# Patient Record
Sex: MALE | Race: WHITE | Employment: FULL TIME | URBAN - METROPOLITAN AREA
[De-identification: names, ages, dates, MRNs, and addresses within clinical notes are randomized per-mention and may not be internally consistent; named-entity substitution may affect disease eponyms.]

---

## 2017-06-19 ENCOUNTER — OFFICE VISIT (OUTPATIENT)
Dept: FAMILY MEDICINE | Facility: CLINIC | Age: 32
End: 2017-06-19

## 2017-06-19 VITALS
SYSTOLIC BLOOD PRESSURE: 104 MMHG | DIASTOLIC BLOOD PRESSURE: 70 MMHG | WEIGHT: 139.2 LBS | HEIGHT: 66 IN | HEART RATE: 80 BPM | BODY MASS INDEX: 22.37 KG/M2

## 2017-06-19 DIAGNOSIS — A63.0 GENITAL WARTS: Primary | ICD-10-CM

## 2017-06-19 PROCEDURE — 54056 CRYOSURGERY PENIS LESION(S): CPT | Performed by: FAMILY MEDICINE

## 2017-06-19 NOTE — PROGRESS NOTES
Subjective:  Enmanuel Nair is a 32 year old male   Chief Complaint   Patient presents with     Patient Request     pt. is here in clinic today with concerns about ? STD. growth on shaft of penis X 2 years.      He had been seen before with a papule on the shaft of the penis and this was felt to be a genital wart. It was very small for a long time and then in the last year and increased in size. He also noticed some small and goes in the suprapubic area. He has tried clipping those off with a nail clipper. It would bleed vigorously but he was able to control it. Now is interested in definitive treatment        Encounter Diagnosis   Name Primary?     Genital warts Yes       ROS:other than noted above, general, HEENT, respiratory, cardiac, gastrointestinal systems are negative    Medical, surgical, social, and family histories, medications and allergies reviewed and updated.    Objective:  Exam:    GENERAL APPEARANCE ADULT: Alert, no acute distress  SKIN: Soft papule on the left shaft of the penis, skin colored raised 6 mm in diameter, with a somewhat verrucous surface. 3 mm papule on the suprapubic area which also has some verrucous appearance but also looks like scar tissue, probably from previous attempts to excise      ASSESSMENT:  1. Genital warts    Discussed pathophysiology of this condition and implications.  Questions answered.     PLAN:    Both papules were frozen ×3 using liquid nitrogen and the freeze-thaw-freeze technique;     Orders Placed This Encounter     DESTRUCT BENIGN LESION, 15 OR MORE     IBUPROFEN PO     Recheck if not completely resolved in a month. Discussed how to care for the wounds as these heal

## 2017-06-19 NOTE — MR AVS SNAPSHOT
"              After Visit Summary   2017    Enmanuel Nair    MRN: [id number]           Patient Information     Date Of Birth          1985        Visit Information        Provider Department      2017 9:20 AM VIANNEY Scott MD [facility]        Today's Diagnoses     Genital warts    -  1       Follow-ups after your visit        Who to contact     If you have questions or need follow up information about today's clinic visit or your schedule please contact [facility] directly at [phone].  Normal or non-critical lab and imaging results will be communicated to you by MyChart, letter or phone within 4 business days after the clinic has received the results. If you do not hear from us within 7 days, please contact the clinic through [employer] or phone. If you have a critical or abnormal lab result, we will notify you by phone as soon as possible.  Submit refill requests through [employer] or call your pharmacy and they will forward the refill request to us. Please allow 3 business days for your refill to be completed.          Additional Information About Your Visit        MyChart Information     [employer] lets you send messages to your doctor, view your test results, renew your prescriptions, schedule appointments and more. To sign up, go to [email or web address] . Click on \"Log in\" on the left side of the screen, which will take you to the Welcome page. Then click on \"Sign up Now\" on the right side of the page.     You will be asked to enter the access code listed below, as well as some personal information. Please follow the directions to create your username and password.     Your access code is: [email or web address]  Expires: 2017  9:59 AM     Your access code will  in 90 days. If you need help or a new code, please call your [facility] or [phone].        Care EveryWhere ID     This is your Care EveryWhere ID. This could be used by other organizations " "to access your [facility] medical records  [id number]        Your Vitals Were     Pulse Height BMI (Body Mass Index)             80 5' 6.25\" (1.683 m) 22.3 kg/m2          Blood Pressure from Last 3 Encounters:   06/19/17 104/70   02/07/14 98/64   10/18/13 133/69    Weight from Last 3 Encounters:   06/19/17 139 lb 3.2 oz (63.1 kg)   02/07/14 130 lb 12.8 oz (59.3 kg)   10/18/13 126 lb (57.2 kg)              We Performed the Following     DESTRUCT BENIGN LESION, 15 OR MORE        Primary Care Provider Office Phone # Fax #    VIANNEY Scott [phone]       [address] [address]        Thank you!     Thank you for choosing [facility]  for your care. Our goal is always to provide you with excellent care. Hearing back from our patients is one way we can continue to improve our services. Please take a few minutes to complete the written survey that you may receive in the mail after your visit with us. Thank you!             Your Updated Medication List - Protect others around you: Learn how to safely use, store and throw away your medicines at www.disposemymeds.org.          This list is accurate as of: 6/19/17  9:59 AM.  Always use your most recent med list.                   Brand Name Dispense Instructions for use    * amphetamine-dextroamphetamine 15 MG per tablet    ADDERALL    60 tablet    Take 1 tablet (15 mg) by mouth 2 times daily       * amphetamine-dextroamphetamine 15 MG per tablet    ADDERALL    60 tablet    Take 1 tablet (15 mg) by mouth 2 times daily       IBUPROFEN PO      Take 200 mg by mouth as needed for moderate pain       * Notice:  This list has 2 medication(s) that are the same as other medications prescribed for you. Read the directions carefully, and ask your doctor or other care provider to review them with you.      "

## 2018-05-29 ENCOUNTER — TELEPHONE (OUTPATIENT)
Dept: FAMILY MEDICINE | Facility: CLINIC | Age: 33
End: 2018-05-29

## 2018-05-29 DIAGNOSIS — S30.860A TICK BITE OF BUTTOCK, INITIAL ENCOUNTER: Primary | ICD-10-CM
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
## 2018-05-29 RX ORDER — DOXYCYCLINE HYCLATE 100 MG
200 TABLET ORAL ONCE
Qty: 2 TABLET | Refills: 0 | Status: SHIPPED | OUTPATIENT
Start: 2018-05-29 | End: 2018-05-29

## 2018-05-29 NOTE — TELEPHONE ENCOUNTER
[facility] and found a deer tick attached to the left upper buttock area.  The tick was somewhat engorged and he was not sure whether this had been present for only a short time.  Potentially it could have been there 48 hours.  Therefore we will treat prophylactically with 200 mg of doxycycline ×1

## 2018-05-29 NOTE — TELEPHONE ENCOUNTER
[facility] checking if its ok to dispense monohydrate instead?    If ok, please call [facility] to inform them: [phone].    Addis Preciado   [facility] [place]

## 2022-07-25 ENCOUNTER — LAB REQUISITION (OUTPATIENT)
Dept: LAB | Age: 37
End: 2022-07-25

## 2022-07-25 DIAGNOSIS — Z13.9 ENCOUNTER FOR SCREENING, UNSPECIFIED: ICD-10-CM

## 2022-07-25 PROCEDURE — PSEU8267 HEPATIC FUNCTION PANEL: Performed by: CLINICAL MEDICAL LABORATORY

## 2022-07-25 PROCEDURE — 80076 HEPATIC FUNCTION PANEL: CPT | Performed by: CLINICAL MEDICAL LABORATORY

## 2022-07-25 PROCEDURE — PSEU8303 URIC ACID: Performed by: CLINICAL MEDICAL LABORATORY

## 2022-07-25 PROCEDURE — 86592 SYPHILIS TEST NON-TREP QUAL: CPT | Performed by: CLINICAL MEDICAL LABORATORY

## 2022-07-25 PROCEDURE — 84550 ASSAY OF BLOOD/URIC ACID: CPT | Performed by: CLINICAL MEDICAL LABORATORY

## 2022-07-25 PROCEDURE — PSEU8240 CALCIUM: Performed by: CLINICAL MEDICAL LABORATORY

## 2022-07-25 PROCEDURE — PSEU8247 CHOLESTEROL: Performed by: CLINICAL MEDICAL LABORATORY

## 2022-07-25 PROCEDURE — PSEU8253 CREATININE: Performed by: CLINICAL MEDICAL LABORATORY

## 2022-07-25 PROCEDURE — 84520 ASSAY OF UREA NITROGEN: CPT | Performed by: CLINICAL MEDICAL LABORATORY

## 2022-07-25 PROCEDURE — PSEU8263 GGT: Performed by: CLINICAL MEDICAL LABORATORY

## 2022-07-25 PROCEDURE — PSEU8294 SODIUM: Performed by: CLINICAL MEDICAL LABORATORY

## 2022-07-25 PROCEDURE — 82435 ASSAY OF BLOOD CHLORIDE: CPT | Performed by: CLINICAL MEDICAL LABORATORY

## 2022-07-25 PROCEDURE — 84478 ASSAY OF TRIGLYCERIDES: CPT | Performed by: CLINICAL MEDICAL LABORATORY

## 2022-07-25 PROCEDURE — 82565 ASSAY OF CREATININE: CPT | Performed by: CLINICAL MEDICAL LABORATORY

## 2022-07-25 PROCEDURE — PSEU9047 TRIGLYCERIDE: Performed by: CLINICAL MEDICAL LABORATORY

## 2022-07-25 PROCEDURE — 86803 HEPATITIS C AB TEST: CPT | Performed by: CLINICAL MEDICAL LABORATORY

## 2022-07-25 PROCEDURE — 82465 ASSAY BLD/SERUM CHOLESTEROL: CPT | Performed by: CLINICAL MEDICAL LABORATORY

## 2022-07-25 PROCEDURE — 84295 ASSAY OF SERUM SODIUM: CPT | Performed by: CLINICAL MEDICAL LABORATORY

## 2022-07-25 PROCEDURE — PSEU8570 RPR (RAPID PLASMA REAGIN) TRADITIONAL SYPHILIS SCREEN ALGORITHM: Performed by: CLINICAL MEDICAL LABORATORY

## 2022-07-25 PROCEDURE — 82310 ASSAY OF CALCIUM: CPT | Performed by: CLINICAL MEDICAL LABORATORY

## 2022-07-25 PROCEDURE — PSEU8239 BLOOD UREA NITROGEN: Performed by: CLINICAL MEDICAL LABORATORY

## 2022-07-25 PROCEDURE — PSEU8279 PHOSPHORUS: Performed by: CLINICAL MEDICAL LABORATORY

## 2022-07-25 PROCEDURE — 84132 ASSAY OF SERUM POTASSIUM: CPT | Performed by: CLINICAL MEDICAL LABORATORY

## 2022-07-25 PROCEDURE — PSEU8528 HEPATITIS C ANTIBODY WITH REFLEX: Performed by: CLINICAL MEDICAL LABORATORY

## 2022-07-25 PROCEDURE — 82977 ASSAY OF GGT: CPT | Performed by: CLINICAL MEDICAL LABORATORY

## 2022-07-25 PROCEDURE — PSEU8103 GLUCOSE LEVEL: Performed by: CLINICAL MEDICAL LABORATORY

## 2022-07-25 PROCEDURE — 82947 ASSAY GLUCOSE BLOOD QUANT: CPT | Performed by: CLINICAL MEDICAL LABORATORY

## 2022-07-25 PROCEDURE — PSEU8246 CHLORIDE: Performed by: CLINICAL MEDICAL LABORATORY

## 2022-07-25 PROCEDURE — 84100 ASSAY OF PHOSPHORUS: CPT | Performed by: CLINICAL MEDICAL LABORATORY

## 2022-07-25 PROCEDURE — PSEU8282 POTASSIUM: Performed by: CLINICAL MEDICAL LABORATORY

## 2022-07-26 LAB
ALBUMIN SERPL-MCNC: 4.3 G/DL (ref 3.6–5.1)
ALP SERPL-CCNC: 76 UNITS/L (ref 45–117)
ALT SERPL-CCNC: 21 UNITS/L
AST SERPL-CCNC: 14 UNITS/L
BILIRUB CONJ SERPL-MCNC: 0.1 MG/DL (ref 0–0.2)
BILIRUB SERPL-MCNC: 0.4 MG/DL (ref 0.2–1)
BUN SERPL-MCNC: 28 MG/DL (ref 6–20)
CALCIUM SERPL-MCNC: 9.7 MG/DL (ref 8.4–10.2)
CHLORIDE SERPL-SCNC: 106 MMOL/L (ref 97–110)
CHOLEST SERPL-MCNC: 257 MG/DL
CREAT SERPL-MCNC: 1.01 MG/DL (ref 0.67–1.17)
FASTING DURATION TIME PATIENT: 10 HOURS (ref 0–999)
FASTING DURATION TIME PATIENT: 10 HOURS (ref 0–999)
FASTING DURATION TIME PATIENT: NORMAL H
GFR SERPLBLD BASED ON 1.73 SQ M-ARVRAT: >90 ML/MIN
GGT SERPL-CCNC: 22 UNITS/L (ref 15–85)
GLUCOSE SERPL-MCNC: 87 MG/DL (ref 70–99)
HCV AB SER QL: NEGATIVE
PHOSPHATE SERPL-MCNC: 3.9 MG/DL (ref 2.4–4.7)
POTASSIUM SERPL-SCNC: 4.7 MMOL/L (ref 3.4–5.1)
PROT SERPL-MCNC: 7.8 G/DL (ref 6.4–8.2)
RPR SER QL: NONREACTIVE
SODIUM SERPL-SCNC: 140 MMOL/L (ref 135–145)
TRIGL SERPL-MCNC: 223 MG/DL
URATE SERPL-MCNC: 5.7 MG/DL (ref 3.5–7.2)

## 2023-02-13 ENCOUNTER — LAB REQUISITION (OUTPATIENT)
Dept: LAB | Age: 38
End: 2023-02-13

## 2023-02-13 DIAGNOSIS — Z13.9 ENCOUNTER FOR SCREENING, UNSPECIFIED: ICD-10-CM

## 2023-02-13 PROCEDURE — 80061 LIPID PANEL: CPT | Performed by: CLINICAL MEDICAL LABORATORY

## 2023-02-13 PROCEDURE — PSEU8270 LIPID PANEL WITHOUT REFLEX: Performed by: CLINICAL MEDICAL LABORATORY

## 2023-02-14 LAB
CHOLEST SERPL-MCNC: 221 MG/DL
CHOLEST/HDLC SERPL: 3.9 {RATIO}
FASTING DURATION TIME PATIENT: 10 HOURS (ref 0–999)
HDLC SERPL-MCNC: 56 MG/DL
LDLC SERPL CALC-MCNC: 134 MG/DL
NONHDLC SERPL-MCNC: 165 MG/DL
TRIGL SERPL-MCNC: 155 MG/DL